# Patient Record
Sex: FEMALE | Race: WHITE | NOT HISPANIC OR LATINO
[De-identification: names, ages, dates, MRNs, and addresses within clinical notes are randomized per-mention and may not be internally consistent; named-entity substitution may affect disease eponyms.]

---

## 2017-12-30 ENCOUNTER — TRANSCRIPTION ENCOUNTER (OUTPATIENT)
Age: 37
End: 2017-12-30

## 2018-05-18 ENCOUNTER — TRANSCRIPTION ENCOUNTER (OUTPATIENT)
Age: 38
End: 2018-05-18

## 2020-01-12 ENCOUNTER — TRANSCRIPTION ENCOUNTER (OUTPATIENT)
Age: 40
End: 2020-01-12

## 2020-02-06 ENCOUNTER — APPOINTMENT (OUTPATIENT)
Dept: SURGERY | Facility: CLINIC | Age: 40
End: 2020-02-06
Payer: COMMERCIAL

## 2020-02-06 VITALS
HEIGHT: 69 IN | BODY MASS INDEX: 27.4 KG/M2 | DIASTOLIC BLOOD PRESSURE: 85 MMHG | WEIGHT: 185 LBS | HEART RATE: 98 BPM | SYSTOLIC BLOOD PRESSURE: 129 MMHG

## 2020-02-06 DIAGNOSIS — L98.9 DISORDER OF THE SKIN AND SUBCUTANEOUS TISSUE, UNSPECIFIED: ICD-10-CM

## 2020-02-06 PROCEDURE — 99203 OFFICE O/P NEW LOW 30 MIN: CPT

## 2020-02-08 RX ORDER — ATORVASTATIN CALCIUM 80 MG/1
TABLET, FILM COATED ORAL
Refills: 0 | Status: ACTIVE | COMMUNITY

## 2020-02-08 RX ORDER — OMEPRAZOLE 20 MG/1
TABLET, ORALLY DISINTEGRATING, DELAYED RELEASE ORAL
Refills: 0 | Status: ACTIVE | COMMUNITY

## 2020-02-08 NOTE — CONSULT LETTER
[Consult Letter:] : I had the pleasure of evaluating your patient, [unfilled]. [Dear  ___] : Dear  [unfilled], [Consult Closing:] : Thank you very much for allowing me to participate in the care of this patient.  If you have any questions, please do not hesitate to contact me. [Please see my note below.] : Please see my note below. [Sincerely,] : Sincerely, [FreeTextEntry2] : Dr. Garland Hurtado [FreeTextEntry3] : Abimael Rush MD, FACS\par System Director, Endocrine Surgery\par [facility]\par

## 2020-02-08 NOTE — HISTORY OF PRESENT ILLNESS
[de-identified] : Pt c/o skin lesion anterior neck for about 5 years.  had a mole removed from this location about 20 years ago.  denies pain or bleeding, drainage, change in size, dysphagia, hoarseness.  sonogram shows skin thickening in area.  unremarkable esophagram

## 2020-02-08 NOTE — ASSESSMENT
[FreeTextEntry1] : likely is a skin cyst. unlikely to be branchial cleft cyst or sinus.  discussed options for management. recommended excision.  potential for infection, recurrence, scarring  discussed. risks, benefits and alternatives discussed at length.  I have discussed with the patient the anatomy of the area, the pathophysiology of the disease process and the rationale for surgery.  The attendant risks, possible complications and expected postoperative course have been discussed in detail.  I have given the patient the opportunity to ask questions, and all questions have been answered to the patient's satisfaction, and she wishes to proceed with the planned procedure.  to be scheduled ambulatory at [facility]

## 2020-02-08 NOTE — PHYSICAL EXAM
[de-identified] : no palpable thyroid nodules [de-identified] : 1.2 cm midline lower neck intradermal and cutaneous mass, well circumscribed with no deep attachments [Laryngoscopy Performed] : laryngoscopy was performed, see procedure section for findings [Midline] : located in midline position [Normal] : orientation to person, place, and time: normal [de-identified] : indirect  laryngoscopy shows normal vocal cord mobility bilaterally with no lesions noted

## 2020-02-13 ENCOUNTER — OUTPATIENT (OUTPATIENT)
Dept: OUTPATIENT SERVICES | Facility: HOSPITAL | Age: 40
LOS: 1 days | End: 2020-02-13

## 2020-02-13 VITALS
HEIGHT: 68 IN | RESPIRATION RATE: 14 BRPM | WEIGHT: 195.99 LBS | OXYGEN SATURATION: 98 % | HEART RATE: 85 BPM | SYSTOLIC BLOOD PRESSURE: 134 MMHG | TEMPERATURE: 97 F | DIASTOLIC BLOOD PRESSURE: 82 MMHG

## 2020-02-13 DIAGNOSIS — Z98.891 HISTORY OF UTERINE SCAR FROM PREVIOUS SURGERY: Chronic | ICD-10-CM

## 2020-02-13 DIAGNOSIS — D48.5 NEOPLASM OF UNCERTAIN BEHAVIOR OF SKIN: ICD-10-CM

## 2020-02-13 DIAGNOSIS — Z98.890 OTHER SPECIFIED POSTPROCEDURAL STATES: Chronic | ICD-10-CM

## 2020-02-13 DIAGNOSIS — N60.09 SOLITARY CYST OF UNSPECIFIED BREAST: Chronic | ICD-10-CM

## 2020-02-13 DIAGNOSIS — D48.9 NEOPLASM OF UNCERTAIN BEHAVIOR, UNSPECIFIED: ICD-10-CM

## 2020-02-13 LAB
HCT VFR BLD CALC: 40.7 % — SIGNIFICANT CHANGE UP (ref 34.5–45)
HGB BLD-MCNC: 12.8 G/DL — SIGNIFICANT CHANGE UP (ref 11.5–15.5)
MCHC RBC-ENTMCNC: 26.8 PG — LOW (ref 27–34)
MCHC RBC-ENTMCNC: 31.4 % — LOW (ref 32–36)
MCV RBC AUTO: 85.1 FL — SIGNIFICANT CHANGE UP (ref 80–100)
NRBC # FLD: 0 K/UL — SIGNIFICANT CHANGE UP (ref 0–0)
PLATELET # BLD AUTO: 213 K/UL — SIGNIFICANT CHANGE UP (ref 150–400)
PMV BLD: 12.5 FL — SIGNIFICANT CHANGE UP (ref 7–13)
RBC # BLD: 4.78 M/UL — SIGNIFICANT CHANGE UP (ref 3.8–5.2)
RBC # FLD: 12.8 % — SIGNIFICANT CHANGE UP (ref 10.3–14.5)
WBC # BLD: 6.74 K/UL — SIGNIFICANT CHANGE UP (ref 3.8–10.5)
WBC # FLD AUTO: 6.74 K/UL — SIGNIFICANT CHANGE UP (ref 3.8–10.5)

## 2020-02-13 NOTE — H&P PST ADULT - GASTROINTESTINAL DETAILS
no organomegaly/nontender/no distention/no bruit/no guarding/no masses palpable/bowel sounds normal/soft/no rebound tenderness/no rigidity

## 2020-02-13 NOTE — H&P PST ADULT - HISTORY OF PRESENT ILLNESS
40y/o female scheduled for excision midline lower neck mass.  Pt states, "had cyst removed in high school, returned over the past couple of yrs.  US shows cyst, causing discomfort, increasing in size."

## 2020-02-13 NOTE — H&P PST ADULT - NEGATIVE GENERAL SYMPTOMS
no weight loss/no anorexia/no chills/no sweating/no malaise/no fatigue/no fever/no weight gain/no polyphagia/no polyuria/no polydipsia

## 2020-02-13 NOTE — H&P PST ADULT - RS GEN PE MLT RESP DETAILS PC
breath sounds equal/no wheezes/clear to auscultation bilaterally/no rhonchi/respirations non-labored/no rales/good air movement/no chest wall tenderness/no intercostal retractions

## 2020-02-13 NOTE — H&P PST ADULT - NEGATIVE ENMT SYMPTOMS
no ear pain/no vertigo/no sinus symptoms/no abnormal taste sensation/no gum bleeding/no nasal discharge/no post-nasal discharge/no tinnitus/no recurrent cold sores/no throat pain/no dysphagia/no nose bleeds/no dry mouth/no hearing difficulty/no nasal congestion/no nasal obstruction

## 2020-02-13 NOTE — H&P PST ADULT - VISION (WITH CORRECTIVE LENSES IF THE PATIENT USUALLY WEARS THEM):
glasses sometimes/Normal vision: sees adequately in most situations; can see medication labels, newsprint

## 2020-02-13 NOTE — H&P PST ADULT - NSICDXPROBLEM_GEN_ALL_CORE_FT
PROBLEM DIAGNOSES  Problem: Neoplasm of uncertain behavior  Assessment and Plan: Pt scheduled for excision midline lower neck mass on 2/28/2020.  labs done results pending, Urine cup provided.  Preop teaching done, pt able to verbalize understanding.

## 2020-02-13 NOTE — H&P PST ADULT - NEGATIVE GENERAL GENITOURINARY SYMPTOMS
no hematuria/no flank pain L/no bladder infections/no flank pain R/no dysuria/normal urinary frequency

## 2020-02-13 NOTE — H&P PST ADULT - NSICDXPASTMEDICALHX_GEN_ALL_CORE_FT
PAST MEDICAL HISTORY:  GERD (gastroesophageal reflux disease)     Hyperlipidemia     Neoplasm of uncertain behavior

## 2020-02-20 PROBLEM — E78.5 HYPERLIPIDEMIA, UNSPECIFIED: Chronic | Status: ACTIVE | Noted: 2020-02-13

## 2020-02-20 PROBLEM — D48.9 NEOPLASM OF UNCERTAIN BEHAVIOR, UNSPECIFIED: Chronic | Status: ACTIVE | Noted: 2020-02-13

## 2020-02-20 PROBLEM — K21.9 GASTRO-ESOPHAGEAL REFLUX DISEASE WITHOUT ESOPHAGITIS: Chronic | Status: ACTIVE | Noted: 2020-02-13

## 2020-02-21 ENCOUNTER — TRANSCRIPTION ENCOUNTER (OUTPATIENT)
Age: 40
End: 2020-02-21

## 2020-02-27 ENCOUNTER — TRANSCRIPTION ENCOUNTER (OUTPATIENT)
Age: 40
End: 2020-02-27

## 2020-02-28 ENCOUNTER — APPOINTMENT (OUTPATIENT)
Dept: SURGERY | Facility: HOSPITAL | Age: 40
End: 2020-02-28

## 2020-02-28 ENCOUNTER — RESULT REVIEW (OUTPATIENT)
Age: 40
End: 2020-02-28

## 2020-02-28 ENCOUNTER — OUTPATIENT (OUTPATIENT)
Dept: OUTPATIENT SERVICES | Facility: HOSPITAL | Age: 40
LOS: 1 days | Discharge: HOME | End: 2020-02-28
Payer: COMMERCIAL

## 2020-02-28 VITALS
WEIGHT: 195.99 LBS | TEMPERATURE: 97 F | SYSTOLIC BLOOD PRESSURE: 122 MMHG | HEART RATE: 85 BPM | HEIGHT: 68 IN | DIASTOLIC BLOOD PRESSURE: 79 MMHG | OXYGEN SATURATION: 99 % | RESPIRATION RATE: 14 BRPM

## 2020-02-28 VITALS
RESPIRATION RATE: 16 BRPM | TEMPERATURE: 98 F | OXYGEN SATURATION: 99 % | SYSTOLIC BLOOD PRESSURE: 105 MMHG | HEART RATE: 79 BPM | DIASTOLIC BLOOD PRESSURE: 60 MMHG

## 2020-02-28 DIAGNOSIS — N60.09 SOLITARY CYST OF UNSPECIFIED BREAST: Chronic | ICD-10-CM

## 2020-02-28 DIAGNOSIS — D48.5 NEOPLASM OF UNCERTAIN BEHAVIOR OF SKIN: ICD-10-CM

## 2020-02-28 DIAGNOSIS — Z98.891 HISTORY OF UTERINE SCAR FROM PREVIOUS SURGERY: Chronic | ICD-10-CM

## 2020-02-28 DIAGNOSIS — Z98.890 OTHER SPECIFIED POSTPROCEDURAL STATES: Chronic | ICD-10-CM

## 2020-02-28 PROCEDURE — 88304 TISSUE EXAM BY PATHOLOGIST: CPT | Mod: 26

## 2020-02-28 PROCEDURE — 21556 EXC NECK TUM DEEP < 5 CM: CPT

## 2020-02-28 RX ORDER — OMEPRAZOLE 10 MG/1
1 CAPSULE, DELAYED RELEASE ORAL
Qty: 0 | Refills: 0 | DISCHARGE

## 2020-02-28 RX ORDER — ATORVASTATIN CALCIUM 80 MG/1
1 TABLET, FILM COATED ORAL
Qty: 0 | Refills: 0 | DISCHARGE

## 2020-02-28 RX ORDER — ACETAMINOPHEN 500 MG
650 TABLET ORAL EVERY 6 HOURS
Refills: 0 | Status: DISCONTINUED | OUTPATIENT
Start: 2020-02-28 | End: 2020-03-14

## 2020-02-28 RX ORDER — SODIUM CHLORIDE 9 MG/ML
1000 INJECTION, SOLUTION INTRAVENOUS
Refills: 0 | Status: DISCONTINUED | OUTPATIENT
Start: 2020-02-28 | End: 2020-03-14

## 2020-02-28 NOTE — ASU DISCHARGE PLAN (ADULT/PEDIATRIC) - CALL YOUR DOCTOR IF YOU HAVE ANY OF THE FOLLOWING:
Pain not relieved by Medications/Swelling that gets worse/Bleeding that does not stop Pain not relieved by Medications/Swelling that gets worse/Wound/Surgical Site with redness, or foul smelling discharge or pus/Bleeding that does not stop

## 2020-02-28 NOTE — ASU DISCHARGE PLAN (ADULT/PEDIATRIC) - CARE PROVIDER_API CALL
Abimael Rush)  Plastic Surgery  [address] [place] [address] [address]  [place] [address]  Phone: [phone]  Fax: [phone]  Follow Up Time:

## 2020-03-09 LAB — SURGICAL PATHOLOGY STUDY: SIGNIFICANT CHANGE UP

## 2020-04-16 ENCOUNTER — APPOINTMENT (OUTPATIENT)
Dept: SURGERY | Facility: CLINIC | Age: 40
End: 2020-04-16

## 2020-06-15 ENCOUNTER — TRANSCRIPTION ENCOUNTER (OUTPATIENT)
Age: 40
End: 2020-06-15

## 2021-08-25 ENCOUNTER — APPOINTMENT (OUTPATIENT)
Dept: HUMAN REPRODUCTION | Facility: CLINIC | Age: 41
End: 2021-08-25
Payer: COMMERCIAL

## 2021-08-25 PROCEDURE — 99204 OFFICE O/P NEW MOD 45 MIN: CPT

## 2021-09-13 ENCOUNTER — APPOINTMENT (OUTPATIENT)
Dept: HUMAN REPRODUCTION | Facility: CLINIC | Age: 41
End: 2021-09-13
Payer: COMMERCIAL

## 2021-09-13 PROCEDURE — 99213 OFFICE O/P EST LOW 20 MIN: CPT | Mod: NC

## 2021-09-13 PROCEDURE — 36415 COLL VENOUS BLD VENIPUNCTURE: CPT | Mod: NC

## 2021-09-13 PROCEDURE — 76830 TRANSVAGINAL US NON-OB: CPT | Mod: NC

## 2021-09-20 ENCOUNTER — APPOINTMENT (OUTPATIENT)
Dept: HUMAN REPRODUCTION | Facility: CLINIC | Age: 41
End: 2021-09-20
Payer: COMMERCIAL

## 2021-09-20 PROCEDURE — 58974 EMBRYO TRANSFER INTRAUTERINE: CPT | Mod: 52

## 2021-09-20 PROCEDURE — 76831 ECHO EXAM UTERUS: CPT

## 2021-09-20 PROCEDURE — 99072 ADDL SUPL MATRL&STAF TM PHE: CPT

## 2021-09-20 PROCEDURE — 58340 CATHETER FOR HYSTEROGRAPHY: CPT

## 2021-09-20 PROCEDURE — 58999I: CUSTOM

## 2021-09-24 ENCOUNTER — APPOINTMENT (OUTPATIENT)
Dept: HUMAN REPRODUCTION | Facility: CLINIC | Age: 41
End: 2021-09-24
Payer: COMMERCIAL

## 2021-09-24 PROCEDURE — 76830 TRANSVAGINAL US NON-OB: CPT

## 2021-09-24 PROCEDURE — 36415 COLL VENOUS BLD VENIPUNCTURE: CPT

## 2021-09-24 PROCEDURE — 99213 OFFICE O/P EST LOW 20 MIN: CPT | Mod: NC

## 2021-09-27 ENCOUNTER — APPOINTMENT (OUTPATIENT)
Dept: HUMAN REPRODUCTION | Facility: CLINIC | Age: 41
End: 2021-09-27
Payer: COMMERCIAL

## 2021-09-27 PROCEDURE — 36415 COLL VENOUS BLD VENIPUNCTURE: CPT | Mod: NC

## 2021-09-27 PROCEDURE — 99213 OFFICE O/P EST LOW 20 MIN: CPT | Mod: NC,25

## 2021-09-27 PROCEDURE — 76830 TRANSVAGINAL US NON-OB: CPT | Mod: NC

## 2021-11-05 ENCOUNTER — APPOINTMENT (OUTPATIENT)
Dept: OBGYN | Facility: CLINIC | Age: 41
End: 2021-11-05
Payer: COMMERCIAL

## 2021-11-05 VITALS
HEIGHT: 69 IN | WEIGHT: 185 LBS | BODY MASS INDEX: 27.4 KG/M2 | DIASTOLIC BLOOD PRESSURE: 64 MMHG | SYSTOLIC BLOOD PRESSURE: 102 MMHG

## 2021-11-05 DIAGNOSIS — N97.2 FEMALE INFERTILITY OF UTERINE ORIGIN: ICD-10-CM

## 2021-11-05 PROCEDURE — 99203 OFFICE O/P NEW LOW 30 MIN: CPT

## 2021-11-05 NOTE — LETTER GREETING
[Dear  ___] : Dear  [unfilled], [FreeTextEntry1] : [facility]\par [address]\[place] [address]\par \par Thank you for referring your patient to my office for consultation. I saw her for an evaluation in the office and my findings are as follows:\par \par

## 2021-11-05 NOTE — END OF VISIT
[FreeTextEntry3] : I, Lucy Mora solely acted as a scribe for Dr. Nicolas Melvin on 11/05/2021 . All medical entries made by the scribe were at my, Dr. Melvin's, direction and personally dictated by me on 11/05/2021 . I have reviewed the chart and agree that the record accurately reflects my personal performance of the history, physical exam, assessment and plan. I have also personally directed, reviewed, and agreed with the chart.

## 2021-11-05 NOTE — PHYSICAL EXAM
[Chaperone Present] : A chaperone was present in the examining room during all aspects of the physical examination [Appropriately responsive] : appropriately responsive [Alert] : alert [No Acute Distress] : no acute distress [Oriented x3] : oriented x3 [FreeTextEntry7] : deferred [FreeTextEntry1] : deferred

## 2021-11-05 NOTE — COUNSELING
[Preconception Care/ Fertility options] : preconception care, fertility options [Pregnancy Options] : pregnancy options [Lab Results] : lab results [Medication Management] : medication management [Pre/Post Op Instructions] : pre/post op instructions

## 2021-11-05 NOTE — HISTORY OF PRESENT ILLNESS
[Patient reported PAP Smear was normal] : Patient reported PAP Smear was normal [Y] : Positive pregnancy history [Currently Active] : currently active [Men] : men [PapSmeardate] : 12/2020 [TextBox_4] : Consult in regards to being a segregate, but has fluid in uterus coming from  scar [TextBox_102] : mid- cycle vaginal spotting [LMPDate] : 10/13/2021 [PGHxTotal] : 2 [[facility]] : 2 [[facility] : 2 [FreeTextEntry1] : C/S x2  [FreeTextEntry4] : s/p ANA 2003

## 2021-11-05 NOTE — LETTER CLOSING
[Thank you very much for allowing me to participate in the care of this patient.] : Thank you very much for allowing me to participate in the care of this patient

## 2021-11-05 NOTE — DISCUSSION/SUMMARY
[FreeTextEntry1] : Consultation regarding gestational surrogacy\par -reviewed pelvic us which was significant for  scar defect\par -consider MRI to evaluate defect\par -Discussed the option for laparoscopic repair of  scar defect vs hormonal mgmt with [facility] if feasible.  A detailed discussion regarding the option for laparoscopic repair of  scar defect was held and the risks, benefits, and alternatives provided. All questions were answered and pt to notify.\par -advised f/u with [facility] in regards to IVF\par -discussed risks of AMA (i.e pre-eclampsia and HTN) \par -recommended f/u for [facility] regarding infertility for sister\par d/w Dr. Dunham\par

## 2022-03-01 NOTE — H&P PST ADULT - NS NEC GEN PE MLT EXAM PC
Pt was cooperative and open with clincian. Speech was somewhat pressured but largely WNL. Oriented x3 Despite some agitation regarding discharge, the pt was calm and cooperative. Speech normal in rate, rhythm, and prosody. Oriented x3 detailed exam

## 2022-05-31 ENCOUNTER — NON-APPOINTMENT (OUTPATIENT)
Age: 42
End: 2022-05-31

## 2022-07-22 NOTE — H&P PST ADULT - GRAVIDA, OB PROFILE
2 Cellcept Pregnancy And Lactation Text: This medication is Pregnancy Category D and isn't considered safe during pregnancy. It is unknown if this medication is excreted in breast milk.

## 2022-11-16 NOTE — ASU DISCHARGE PLAN (ADULT/PEDIATRIC) - DRESSING DRY FT
1 A-T Advancement Flap Text: The defect edges were debeveled with a #15 scalpel blade.  Given the location of the defect, shape of the defect and the proximity to free margins an A-T advancement flap was deemed most appropriate.  Using a sterile surgical marker, an appropriate advancement flap was drawn incorporating the defect and placing the expected incisions within the relaxed skin tension lines where possible.    The area thus outlined was incised deep to adipose tissue with a #15 scalpel blade.  The skin margins were undermined to an appropriate distance in all directions utilizing iris scissors.

## 2023-02-08 ENCOUNTER — NON-APPOINTMENT (OUTPATIENT)
Age: 43
End: 2023-02-08

## 2023-04-23 ENCOUNTER — NON-APPOINTMENT (OUTPATIENT)
Age: 43
End: 2023-04-23

## 2023-11-05 ENCOUNTER — NON-APPOINTMENT (OUTPATIENT)
Age: 43
End: 2023-11-05

## 2024-02-29 ENCOUNTER — NON-APPOINTMENT (OUTPATIENT)
Age: 44
End: 2024-02-29

## 2024-05-16 NOTE — H&P PST ADULT - GENERAL
[facility] AMBULATORY ENCOUNTER  FAMILY PRACTICE OFFICE VISIT    [facility] PROGRESS NOTE      Subjective     Charles is a 47 year old here for Office Visit and Derm Problem (Possible cyst on Rt index finger of Rt hand x2 months)     Finger concern:   Noticed small bump to joint of right index finger 03/2024. Initially felt like a \"BB.\" Has since been increasing in size, now the size of peppercorn. Uncomfortable when grabbing doorknobs, etc. Requesting removal if possible.       SDOH Never Smoker         Objective   Vitals:    05/16/24 1324   BP: 100/66   Pulse: 78   Resp: 16   SpO2: 98%   Weight: 60.6 kg (133 lb 11.2 oz)   Height: 5' 5\" (1.651 m)     Physical Exam  Vitals and nursing note reviewed.   Constitutional:       General: She is not in acute distress.     Appearance: Normal appearance. She is not ill-appearing, toxic-appearing or diaphoretic.   HENT:      Head: Normocephalic and atraumatic.   Eyes:      Extraocular Movements: Extraocular movements intact.      Conjunctiva/sclera: Conjunctivae normal.      Pupils: Pupils are equal, round, and reactive to light.   Pulmonary:      Effort: Pulmonary effort is normal. No respiratory distress.   Musculoskeletal:        Hands:    Skin:     General: Skin is warm and dry.   Neurological:      General: No focal deficit present.      Mental Status: She is alert and oriented to person, place, and time. Mental status is at baseline.      Motor: No weakness.      Coordination: Coordination normal.      Gait: Gait normal.   Psychiatric:         Mood and Affect: Mood normal.         Behavior: Behavior normal.         Thought Content: Thought content normal.         Judgment: Judgment normal.                ASSESSMENT AND PLAN        1. Finger mass, right  Discussed suspect cyst but reviewed various potential etiologies. Referral to hand specialist placed for excision. Patient provided with scheduling #.   -     SERVICE TO HAND SURGERY      Return for PCP follow-up as previously  scheduled.      TAYLOR Fernando  Supporting Virginia Boyd MD, Internal Medicine   details…

## 2024-08-20 ENCOUNTER — NON-APPOINTMENT (OUTPATIENT)
Age: 44
End: 2024-08-20

## 2025-04-01 NOTE — H&P PST ADULT - BSA (M2)
Incoming refill request for fluocinolone acetonide (DERMA-SMOOTHE/FS BODY) 0.01 % external oil. Last appointment was 12/11/2023 (No-showed on 12/02/2024). Next appointment 6/16/2025. Routed to: Dr. Meneses.    Ayad Nunez RN       2.03

## 2025-04-14 ENCOUNTER — APPOINTMENT (OUTPATIENT)
Dept: PAIN MANAGEMENT | Facility: CLINIC | Age: 45
End: 2025-04-14

## 2025-04-14 ENCOUNTER — NON-APPOINTMENT (OUTPATIENT)
Age: 45
End: 2025-04-14

## 2025-04-14 VITALS
DIASTOLIC BLOOD PRESSURE: 96 MMHG | SYSTOLIC BLOOD PRESSURE: 140 MMHG | HEIGHT: 69 IN | HEART RATE: 96 BPM | BODY MASS INDEX: 25.18 KG/M2 | WEIGHT: 170 LBS

## 2025-04-14 DIAGNOSIS — G44.52 NEW DAILY PERSISTENT HEADACHE (NDPH): ICD-10-CM

## 2025-04-14 PROCEDURE — 99204 OFFICE O/P NEW MOD 45 MIN: CPT

## 2025-04-14 RX ORDER — DICLOFENAC SODIUM 75 MG/1
75 TABLET, DELAYED RELEASE ORAL
Qty: 24 | Refills: 0 | Status: ACTIVE | COMMUNITY
Start: 2025-04-14 | End: 1900-01-01

## 2025-05-16 ENCOUNTER — NON-APPOINTMENT (OUTPATIENT)
Age: 45
End: 2025-05-16